# Patient Record
Sex: FEMALE | Race: OTHER | URBAN - METROPOLITAN AREA
[De-identification: names, ages, dates, MRNs, and addresses within clinical notes are randomized per-mention and may not be internally consistent; named-entity substitution may affect disease eponyms.]

---

## 2020-02-20 NOTE — PATIENT DISCUSSION
BOTOX for COSMETIC (40 units): The patient presented with multiple facial rhytids after informed consent the patient was treated with Botox at a dosage documented on [employer] in this cart. The patient tolerated the procedure well.

## 2022-09-28 ENCOUNTER — NEW PATIENT (OUTPATIENT)
Dept: URBAN - METROPOLITAN AREA CLINIC 2 | Facility: CLINIC | Age: 67
End: 2022-09-28

## 2022-09-28 DIAGNOSIS — H34.9: ICD-10-CM

## 2022-09-28 DIAGNOSIS — H35.372: ICD-10-CM

## 2022-09-28 DIAGNOSIS — H43.811: ICD-10-CM

## 2022-09-28 DIAGNOSIS — H10.403: ICD-10-CM

## 2022-09-28 DIAGNOSIS — H18.603: ICD-10-CM

## 2022-09-28 DIAGNOSIS — H25.813: ICD-10-CM

## 2022-09-28 PROCEDURE — 92015 DETERMINE REFRACTIVE STATE: CPT

## 2022-09-28 PROCEDURE — 92134 CPTRZ OPH DX IMG PST SGM RTA: CPT

## 2022-09-28 PROCEDURE — 92025 CPTRIZED CORNEAL TOPOGRAPHY: CPT

## 2022-09-28 PROCEDURE — 99204 OFFICE O/P NEW MOD 45 MIN: CPT

## 2022-09-28 ASSESSMENT — TONOMETRY
OD_IOP_MMHG: 11
OS_IOP_MMHG: 12

## 2022-09-28 ASSESSMENT — VISUAL ACUITY
OU_SC: J1+
OS_CC: 20/40-2
OS_SC: 20/400
OS_PH: 20/30
OD_PH: 20/25-1
OD_SC: 20/400
OD_CC: 20/30-2

## 2022-09-28 ASSESSMENT — KERATOMETRY
OS_AXISANGLE_DEGREES: 160
OD_K2POWER_DIOPTERS: 49.25
OD_AXISANGLE2_DEGREES: 70
OD_K1POWER_DIOPTERS: 48.00
OS_K2POWER_DIOPTERS: 48.00
OD_AXISANGLE_DEGREES: 160
OS_K1POWER_DIOPTERS: 47.00
OS_AXISANGLE2_DEGREES: 70

## 2023-01-18 ENCOUNTER — DIAGNOSTICS ONLY (OUTPATIENT)
Dept: URBAN - METROPOLITAN AREA CLINIC 2 | Facility: CLINIC | Age: 68
End: 2023-01-18

## 2023-01-18 DIAGNOSIS — H52.4: ICD-10-CM

## 2023-01-18 PROCEDURE — 92015 DETERMINE REFRACTIVE STATE: CPT

## 2023-01-18 ASSESSMENT — VISUAL ACUITY
OD_CC: 20/50 +2
OS_CC: 20/25 -2

## 2023-01-18 ASSESSMENT — KERATOMETRY
OS_AXISANGLE_DEGREES: 160
OD_AXISANGLE_DEGREES: 160
OS_AXISANGLE2_DEGREES: 70
OS_K1POWER_DIOPTERS: 47.00
OD_K2POWER_DIOPTERS: 49.25
OS_K2POWER_DIOPTERS: 48.00
OD_AXISANGLE2_DEGREES: 70
OD_K1POWER_DIOPTERS: 48.00

## 2023-11-21 ENCOUNTER — COMPREHENSIVE EXAM (OUTPATIENT)
Dept: URBAN - METROPOLITAN AREA CLINIC 2 | Facility: CLINIC | Age: 68
End: 2023-11-21

## 2023-11-21 DIAGNOSIS — H35.372: ICD-10-CM

## 2023-11-21 DIAGNOSIS — H25.813: ICD-10-CM

## 2023-11-21 DIAGNOSIS — H34.9: ICD-10-CM

## 2023-11-21 DIAGNOSIS — H43.811: ICD-10-CM

## 2023-11-21 DIAGNOSIS — H10.45: ICD-10-CM

## 2023-11-21 DIAGNOSIS — H18.603: ICD-10-CM

## 2023-11-21 PROCEDURE — 92014 COMPRE OPH EXAM EST PT 1/>: CPT

## 2023-11-21 PROCEDURE — 92015 DETERMINE REFRACTIVE STATE: CPT

## 2023-11-21 PROCEDURE — 92134 CPTRZ OPH DX IMG PST SGM RTA: CPT

## 2023-11-21 ASSESSMENT — VISUAL ACUITY
OD_CC: 20/30
OD_BAT: 20/30
OS_CC: 20/30
OS_BAT: 20/40

## 2023-11-21 ASSESSMENT — KERATOMETRY
OS_K1POWER_DIOPTERS: 46.50
OS_K2POWER_DIOPTERS: 47.25
OS_AXISANGLE_DEGREES: 160
OD_K1POWER_DIOPTERS: 48.00
OD_AXISANGLE_DEGREES: 160
OD_AXISANGLE2_DEGREES: 70
OS_AXISANGLE2_DEGREES: 70
OD_K2POWER_DIOPTERS: 49.25

## 2023-11-21 ASSESSMENT — TONOMETRY
OS_IOP_MMHG: 12
OD_IOP_MMHG: 12